# Patient Record
(demographics unavailable — no encounter records)

---

## 2025-07-18 NOTE — HISTORY OF PRESENT ILLNESS
[de-identified] : 7/18/25: 66 y/o F presents with hearing loss over the past 6 months. She went to outside audiologist to have hearing checked, but she was found to have cerumen impaction, referred here for removal. She denies tinnitus, otalgia, otorrhea, or vertiginous symptoms. She stopped using qtips. She has been using OTC otic drops to soften cerumen.

## 2025-07-18 NOTE — PROCEDURE
[FreeTextEntry3] :  Ear cleaning: Cerumen Removal/Ear Cleaning for Otitis Externa Pre-operative Diagnosis: left Cerumen Impaction Post-operative Diagnosis: Same Procedure: Binocular microscopy with cerumen removal- [id number] Procedure Details: The patient was placed in the supine position. The operating microscope was positioned. I then placed the ear speculum in the EAC. Cerumen was then removed using a mixture of otologic curettes, and suction. The TM was noted to be intact. I then performed the procedure of the opposite ear in similar fashion. The patient tolerated procedure well. Findings: Bilateral Ear Canal - normal Bilateral Tympanic Membrane - normal Recommendations: Debrox Complications: None

## 2025-07-18 NOTE — PHYSICAL EXAM
[Normal] : mucosa is normal [Midline] : trachea located in midline position [de-identified] : left cerumen impaction- cleaned away with suction/curette with no issues.